# Patient Record
Sex: MALE | Race: WHITE | NOT HISPANIC OR LATINO | Employment: OTHER | URBAN - METROPOLITAN AREA
[De-identification: names, ages, dates, MRNs, and addresses within clinical notes are randomized per-mention and may not be internally consistent; named-entity substitution may affect disease eponyms.]

---

## 2018-04-23 ENCOUNTER — OFFICE VISIT (OUTPATIENT)
Dept: OTOLARYNGOLOGY | Facility: CLINIC | Age: 83
End: 2018-04-23
Payer: MEDICARE

## 2018-04-23 VITALS
BODY MASS INDEX: 24.87 KG/M2 | HEART RATE: 62 BPM | WEIGHT: 173.75 LBS | HEIGHT: 70 IN | SYSTOLIC BLOOD PRESSURE: 147 MMHG | TEMPERATURE: 98 F | DIASTOLIC BLOOD PRESSURE: 72 MMHG

## 2018-04-23 DIAGNOSIS — R49.0 DYSPHONIA: Primary | ICD-10-CM

## 2018-04-23 PROCEDURE — 99203 OFFICE O/P NEW LOW 30 MIN: CPT | Mod: 25,S$PBB,, | Performed by: OTOLARYNGOLOGY

## 2018-04-23 PROCEDURE — 99203 OFFICE O/P NEW LOW 30 MIN: CPT | Mod: PBBFAC,PO | Performed by: OTOLARYNGOLOGY

## 2018-04-23 PROCEDURE — 31575 DIAGNOSTIC LARYNGOSCOPY: CPT | Mod: S$PBB,,, | Performed by: OTOLARYNGOLOGY

## 2018-04-23 PROCEDURE — 99999 PR PBB SHADOW E&M-NEW PATIENT-LVL III: CPT | Mod: PBBFAC,,, | Performed by: OTOLARYNGOLOGY

## 2018-04-23 PROCEDURE — 31575 DIAGNOSTIC LARYNGOSCOPY: CPT | Mod: PBBFAC,PO | Performed by: OTOLARYNGOLOGY

## 2018-04-23 RX ORDER — LEVOTHYROXINE SODIUM 100 UG/1
TABLET ORAL
COMMUNITY
End: 2023-03-27 | Stop reason: DRUGHIGH

## 2018-04-23 RX ORDER — SIMVASTATIN 20 MG/1
20 TABLET, FILM COATED ORAL NIGHTLY
COMMUNITY

## 2018-04-23 RX ORDER — METFORMIN HYDROCHLORIDE 500 MG/1
500 TABLET ORAL
COMMUNITY

## 2018-04-23 NOTE — PROGRESS NOTES
Referring Provider:    No referring provider defined for this encounter.  Subjective:   Patient: Armando Mckeon [id number], :1935   Visit date:2018 3:41 PM    Chief Complaint:  Sore Throat (PCP N/A pt states that he has chronic hoarseness, ab 6-7 months. )    HPI:  Armando is a 82 y.o. 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   Post nasal drip or significant rhinorrhea:  No  Bad taste in the back of the mouth: No  Heartburn: No  Number of cups of caffeinated beverages daily: 1  Number of alcoholic beverages daily: 0  Smoking: No  Prior medical therapy:    - None - and Proton Pump Inhibitor or H2 Blocker which has been ineffective.    Primary complaint is decreased vocal projection as well as lower tone.        Review of Systems:  Negative unless checked off.  Gen:  []fever   []fatigue  HENT:  []nosebleeds  []dental problem   Eyes:  []photophobia  []visual disturbance  Resp:  []chest tightness []wheezing  Card:  []chest pain  []leg swelling  GI:  []abdominal pain []blood in stool  :  []dysuria  []hematuria  Musc:  []joint swelling  []gait problem  Skin:  []color change  []pallor  Neuro:  []seizures  []numbness  Hem:  []bruise/bleed easily  Psych:  []hallucinations  []behavioral problems  Allergy/Imm: has No Known Allergies.    His meds, allergies, medical, surgical, social & family histories were reviewed & updated:  -     He has a current medication list which includes the following prescription(s): levothyroxine, metformin, and simvastatin.  -     He  has no past medical history on file.   -     He  does not have a problem list on file.   -     He  has no past surgical history on file.  -     He  reports that he has quit smoking. He does not have  "any smokeless tobacco history on file.  -     His family history is not on file.  -     He has No Known Allergies.    Objective:   Physical Exam:  Vitals:  BP (!) 147/72 (BP Location: Left arm, Patient Position: Sitting)   Pulse 62   Temp 97.5 °F (36.4 °C) (Tympanic)   Ht 5' 10" (1.778 m)   Wt 78.8 kg (173 lb 11.6 oz)   BMI 24.93 kg/m²   General appearance:  Well developed, well nourished    Eyes:  Extraocular motions intact, PERRL    Communication:  no hoarseness, no dysphonia    Ears:  Otoscopy of external auditory canals and tympanic membranes was normal, clinical speech reception thresholds grossly intact, no mass/lesion of auricle.  Nose:  No masses/lesions of external nose, nasal mucosa, septum, and turbinates were within normal limits.  Mouth:  No mass/lesion of lips, teeth, gums, hard/soft palate, tongue, tonsils, or oropharynx.    Cardiovascular:  No pedal edema; Radial Pulses +2     Neck & Lymphatics:  No cervical lymphadenopathy, no neck mass/crepitus/ asymmetry, trachea is midline, no thyroid enlargement/tenderness/mass.    Psych: Oriented x3,  Alert with normal mood and affect.     Respiration/Chest:  Symmetric expansion during respiration, normal respiratory effort.    Skin:  Warm and intact. No ulcerations of face, scalp, neck.      Assessment & Plan:   Recommend comprehensive voice assessment with Dr. Warner.  Referral placed today. We discussed his medical conditions, treatments and plan.  Armando should return to clinic if any issues arise (symptoms worsen or persist), otherwise we will see him back in the clinic only as needed.              -------------------------------------------------------------------------------------------------------------------        Endoscopic Exam:  Patient: Armando Mckeon [id number], :1935  Procedure date:2018  Patient's medications, allergies, past medical, surgical, social and family histories were reviewed and updated as appropriate.  Chief " Complaint:  Sore Throat (PCP N/A pt states that he has chronic hoarseness, ab 6-7 months. )    HPI:  Armando is a 82 y.o. male with the history of present illness as discussed in the clinic note from today.    Procedure: Risks, benefits, and alternatives of the procedure were discussed with the patient, and the patient consented to the nasal endoscopy.  The nasal cavity was sprayed with a topical decongestant and anesthetic (if needed). The endoscope was passed into each nostril and each nasal cavity was visualized.  On each side the nasal cavity, sinuses (if open), turbinates, and septum were examined with the findings described below. At the end of the examination, the scope was removed. The patient tolerated the procedure well with no complications.       Endoscopic Exam Findings:  -     The right side has normal mucosa  -     The left side has normal mucosa  -     Nasal secretions: No discolored secretions noted bilaterally  -     Nasal septum: no significant deviation or perforation appreciated   -     Inferior turbinate: Normal mucosa without significant hypertrophy bilaterally  -     Middle turbinate: Normal mucosa without significant hypertrophy bilaterally  -     Other findings: No mass or obstructive lesion      -     Laryngeal mucosa is normal  -     Posterior commissure has mild  hypertrophy  -     Lingual tonsils have no  hypertrophy  -     Adenoids have no hypertrophy  -     Right vocal fold: normal mobility     mass/lesion: none  -     Left vocal fold: normal mobility     mass/lesion: none  -     Other findings: tiny nodule on left TVF    Assessment & Plan:  - see today's clinic note

## 2023-01-12 ENCOUNTER — OFFICE VISIT (OUTPATIENT)
Dept: FAMILY MEDICINE | Facility: CLINIC | Age: 88
End: 2023-01-12
Payer: MEDICARE

## 2023-01-12 VITALS
TEMPERATURE: 98 F | OXYGEN SATURATION: 95 % | RESPIRATION RATE: 18 BRPM | SYSTOLIC BLOOD PRESSURE: 118 MMHG | DIASTOLIC BLOOD PRESSURE: 61 MMHG | WEIGHT: 189.13 LBS | HEART RATE: 67 BPM

## 2023-01-12 DIAGNOSIS — R73.01 IMPAIRED FASTING GLUCOSE: ICD-10-CM

## 2023-01-12 DIAGNOSIS — E03.9 ACQUIRED HYPOTHYROIDISM: Primary | ICD-10-CM

## 2023-01-12 PROCEDURE — 99203 PR OFFICE/OUTPT VISIT, NEW, LEVL III, 30-44 MIN: ICD-10-PCS | Mod: S$PBB,,, | Performed by: INTERNAL MEDICINE

## 2023-01-12 PROCEDURE — 99999 PR PBB SHADOW E&M-NEW PATIENT-LVL III: ICD-10-PCS | Mod: PBBFAC,,, | Performed by: INTERNAL MEDICINE

## 2023-01-12 PROCEDURE — 99999 PR PBB SHADOW E&M-NEW PATIENT-LVL III: CPT | Mod: PBBFAC,,, | Performed by: INTERNAL MEDICINE

## 2023-01-12 PROCEDURE — 99203 OFFICE O/P NEW LOW 30 MIN: CPT | Mod: PBBFAC,PN | Performed by: INTERNAL MEDICINE

## 2023-01-12 PROCEDURE — 99203 OFFICE O/P NEW LOW 30 MIN: CPT | Mod: S$PBB,,, | Performed by: INTERNAL MEDICINE

## 2023-01-12 RX ORDER — LEVOTHYROXINE SODIUM 125 UG/1
125 TABLET ORAL
COMMUNITY
End: 2023-01-13 | Stop reason: SDUPTHER

## 2023-01-12 RX ORDER — TAMSULOSIN HYDROCHLORIDE 0.4 MG/1
1 CAPSULE ORAL DAILY
COMMUNITY
Start: 2022-12-06

## 2023-01-12 RX ORDER — METFORMIN HYDROCHLORIDE 500 MG/1
500 TABLET ORAL 2 TIMES DAILY WITH MEALS
COMMUNITY
End: 2023-01-12

## 2023-01-12 NOTE — PATIENT INSTRUCTIONS
Take levothyroxine (Thyroid medication) first thing in the morning on empty stomach  Do not take any food for thirty minutes after taking this medication    Will get blood work done in four weeks to follow up thyroid level and to see me one week after bloodwork

## 2023-01-12 NOTE — PROGRESS NOTES
Subjective:       Patient ID: Armando Mckeon is a 87 y.o. male.    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 He has been asking repetivie questions for several months late last year he had episode of getting lost while driving and has not driven since then. He continues to be very active (works in [employer]) he has been prescribed levothyroxine for > 10 years. Review of records show TSH has been elevated at last two checks. He is not sure what dosage he is taking. He takes the medicaiton at varying times of day sometiems with food. No dysphagia no personal history of neck or chest radiation     PMHx: reported history of diabetes he is not taking any medication for this (metformin was listed on one of his outside medicaiton lists but he reports not taking anythign other then thyroid medicaiton)     Review of Systems   Constitutional:  Negative for activity change, fever and unexpected weight change.   HENT:  Negative for congestion, rhinorrhea, sore throat and trouble swallowing.    Eyes:  Negative for photophobia and redness.   Respiratory:  Negative for cough, chest tightness, shortness of breath and wheezing.    Cardiovascular:  Negative for chest pain, palpitations and leg swelling.   Gastrointestinal:  Negative for abdominal pain, blood in stool, constipation, diarrhea, nausea and vomiting.   Endocrine: Negative for cold intolerance, heat intolerance and polyuria.   Genitourinary:  Negative for decreased urine volume, difficulty urinating, dysuria and urgency.   Musculoskeletal:  Negative for arthralgias and back pain.   Skin:  Negative for rash.   Neurological:  Negative for dizziness, syncope, weakness and headaches.   Psychiatric/Behavioral:  Positive for confusion. Negative for dysphoric mood, sleep disturbance and suicidal ideas.       Objective:     Vitals:    01/12/23 1459   BP: 118/61   Pulse: 67   Resp: 18   Temp: 98.3 °F (36.8 °C)   TempSrc: Oral   SpO2: 95%   Weight: 85.8 kg (189 lb 2.5 oz)          Physical Exam  Constitutional:       Appearance: He is well-developed.   HENT:      Head: Normocephalic and atraumatic.   Eyes:      Conjunctiva/sclera: Conjunctivae normal.   Cardiovascular:      Rate and Rhythm: Normal rate and regular rhythm.      Heart sounds: No murmur heard.    No friction rub. No gallop.   Pulmonary:      Effort: Pulmonary effort is normal.      Breath sounds: Normal breath sounds. No wheezing or rales.   Abdominal:      Palpations: Abdomen is soft.      Tenderness: There is no abdominal tenderness. There is no guarding or rebound.   Musculoskeletal:         General: No tenderness. Normal range of motion.      Cervical back: Normal range of motion and neck supple.   Lymphadenopathy:      Cervical: No cervical adenopathy.   Skin:     General: Skin is warm and dry.   Neurological:      Mental Status: He is alert and oriented to person, place, and time.      Cranial Nerves: No cranial nerve deficit.      Comments: Normal gait able to step up to exam table without assistance no cogwheeling of wrists no LE swelling   Psychiatric:      Comments: Frequent repeative questions regarding thyroid medication. He is oriented to time place and person        Assessment and Plan   1. Acquired hypothyroidism  Unclear compliance and what dose of levothyroxine he is taking stressed importance of taking same time each day on empty stomach, i've asked his parishoner friend to contact clinic to confirm dosage of levothyroxine he is currently taking will plan to repeat tsh in four weeks after taking in consistent matter to further determine mediation adjustment or further neurological work up for poor short term memory  - Comprehensive Metabolic Panel; Future  - TSH; Future  - CBC Auto Differential; Future    2. Impaired fasting glucose  No  medications currently check a1c with next labs  - Hemoglobin A1C; Future

## 2023-01-13 DIAGNOSIS — E03.9 ACQUIRED HYPOTHYROIDISM: Primary | ICD-10-CM

## 2023-01-13 RX ORDER — METFORMIN HYDROCHLORIDE 500 MG/1
500 TABLET ORAL 2 TIMES DAILY WITH MEALS
COMMUNITY
End: 2023-01-13

## 2023-01-13 RX ORDER — LEVOTHYROXINE SODIUM 125 UG/1
125 TABLET ORAL
Qty: 90 TABLET | Refills: 0 | Status: SHIPPED | OUTPATIENT
Start: 2023-01-13 | End: 2023-03-27

## 2023-01-13 RX ORDER — METFORMIN HYDROCHLORIDE 500 MG/1
500 TABLET ORAL 2 TIMES DAILY WITH MEALS
COMMUNITY

## 2023-01-13 NOTE — TELEPHONE ENCOUNTER
Caregiver also reports that she and the patient don't recall being told that he has diabetes and they're unsure if he should be on Metformin - they would like to wait until he gets the blood work drawn.

## 2023-01-13 NOTE — TELEPHONE ENCOUNTER
----- Message from Carli Godinez sent at 1/13/2023  9:52 AM CST -----  Regarding: Self/   [phone]  Type: RX Refill Request    Who Called:  Patient    Refill or New Rx:  Refill    RX Name and Strength:  levothyroxine (SYNTHROID) 125 MCG tablet    Preferred Pharmacy with phone number:  "[id number] - [place], [place] - [address] AT [facility]    Local or Mail Order:  Local    Ordering Provider:  Bo    Would the patient rather a call back or a response via [facility]?  Call back    Best Call Back Number:  [phone]

## 2023-01-13 NOTE — TELEPHONE ENCOUNTER
----- Message from Carli Godinez sent at 1/13/2023  9:54 AM CST -----  Regarding: Kimberly/ Caregiver/  [phone]  Type: Patient Call Back    Who called:  Patient    What is the request in detail:  Patient called to give medication:  Metformin 500 MG,  Tamsulosin .4 MG and Levothyroxine 125 MCG.  Thank you    Would the patient rather a call back or a response via [facility]?      Best call back number:  [phone]        Thank you

## 2023-01-13 NOTE — TELEPHONE ENCOUNTER
No new care gaps identified.  [facility] Embedded Care Gaps. Reference number: [id number]. 1/13/2023   10:02:13 AM CST

## 2023-02-08 ENCOUNTER — TELEPHONE (OUTPATIENT)
Dept: FAMILY MEDICINE | Facility: CLINIC | Age: 88
End: 2023-02-08
Payer: MEDICARE

## 2023-02-08 ENCOUNTER — LAB VISIT (OUTPATIENT)
Dept: LAB | Facility: OTHER | Age: 88
End: 2023-02-08
Attending: INTERNAL MEDICINE
Payer: MEDICARE

## 2023-02-08 DIAGNOSIS — E03.9 ACQUIRED HYPOTHYROIDISM: ICD-10-CM

## 2023-02-08 DIAGNOSIS — R41.81 AGE-RELATED COGNITIVE DECLINE: Primary | ICD-10-CM

## 2023-02-08 DIAGNOSIS — R73.01 IMPAIRED FASTING GLUCOSE: ICD-10-CM

## 2023-02-08 LAB
ALBUMIN SERPL BCP-MCNC: 3.9 G/DL (ref 3.5–5.2)
ALP SERPL-CCNC: 47 U/L (ref 55–135)
ALT SERPL W/O P-5'-P-CCNC: 14 U/L (ref 10–44)
ANION GAP SERPL CALC-SCNC: 7 MMOL/L (ref 8–16)
AST SERPL-CCNC: 19 U/L (ref 10–40)
BASOPHILS # BLD AUTO: 0.03 K/UL (ref 0–0.2)
BASOPHILS NFR BLD: 0.4 % (ref 0–1.9)
BILIRUB SERPL-MCNC: 0.7 MG/DL (ref 0.1–1)
BUN SERPL-MCNC: 23 MG/DL (ref 8–23)
CALCIUM SERPL-MCNC: 9.4 MG/DL (ref 8.7–10.5)
CHLORIDE SERPL-SCNC: 107 MMOL/L (ref 95–110)
CO2 SERPL-SCNC: 27 MMOL/L (ref 23–29)
CREAT SERPL-MCNC: 1.1 MG/DL (ref 0.5–1.4)
DIFFERENTIAL METHOD: ABNORMAL
EOSINOPHIL # BLD AUTO: 0.1 K/UL (ref 0–0.5)
EOSINOPHIL NFR BLD: 1.7 % (ref 0–8)
ERYTHROCYTE [DISTWIDTH] IN BLOOD BY AUTOMATED COUNT: 13.4 % (ref 11.5–14.5)
EST. GFR  (NO RACE VARIABLE): >60 ML/MIN/1.73 M^2
ESTIMATED AVG GLUCOSE: 126 MG/DL (ref 68–131)
GLUCOSE SERPL-MCNC: 97 MG/DL (ref 70–110)
HBA1C MFR BLD: 6 % (ref 4–5.6)
HCT VFR BLD AUTO: 40.4 % (ref 40–54)
HGB BLD-MCNC: 12.9 G/DL (ref 14–18)
IMM GRANULOCYTES # BLD AUTO: 0.02 K/UL (ref 0–0.04)
IMM GRANULOCYTES NFR BLD AUTO: 0.2 % (ref 0–0.5)
LYMPHOCYTES # BLD AUTO: 2.4 K/UL (ref 1–4.8)
LYMPHOCYTES NFR BLD: 28.8 % (ref 18–48)
MCH RBC QN AUTO: 30.8 PG (ref 27–31)
MCHC RBC AUTO-ENTMCNC: 31.9 G/DL (ref 32–36)
MCV RBC AUTO: 96 FL (ref 82–98)
MONOCYTES # BLD AUTO: 0.8 K/UL (ref 0.3–1)
MONOCYTES NFR BLD: 9.3 % (ref 4–15)
NEUTROPHILS # BLD AUTO: 4.9 K/UL (ref 1.8–7.7)
NEUTROPHILS NFR BLD: 59.6 % (ref 38–73)
NRBC BLD-RTO: 0 /100 WBC
PLATELET # BLD AUTO: 185 K/UL (ref 150–450)
PMV BLD AUTO: 10.8 FL (ref 9.2–12.9)
POTASSIUM SERPL-SCNC: 4.5 MMOL/L (ref 3.5–5.1)
PROT SERPL-MCNC: 6.9 G/DL (ref 6–8.4)
RBC # BLD AUTO: 4.19 M/UL (ref 4.6–6.2)
SODIUM SERPL-SCNC: 141 MMOL/L (ref 136–145)
T4 FREE SERPL-MCNC: 1.03 NG/DL (ref 0.71–1.51)
TSH SERPL DL<=0.005 MIU/L-ACNC: 0.37 UIU/ML (ref 0.4–4)
WBC # BLD AUTO: 8.27 K/UL (ref 3.9–12.7)

## 2023-02-08 PROCEDURE — 84439 ASSAY OF FREE THYROXINE: CPT | Performed by: INTERNAL MEDICINE

## 2023-02-08 PROCEDURE — 85025 COMPLETE CBC W/AUTO DIFF WBC: CPT | Performed by: INTERNAL MEDICINE

## 2023-02-08 PROCEDURE — 83036 HEMOGLOBIN GLYCOSYLATED A1C: CPT | Performed by: INTERNAL MEDICINE

## 2023-02-08 PROCEDURE — 36415 COLL VENOUS BLD VENIPUNCTURE: CPT | Performed by: INTERNAL MEDICINE

## 2023-02-08 PROCEDURE — 80053 COMPREHEN METABOLIC PANEL: CPT | Performed by: INTERNAL MEDICINE

## 2023-02-08 PROCEDURE — 84443 ASSAY THYROID STIM HORMONE: CPT | Performed by: INTERNAL MEDICINE

## 2023-02-08 NOTE — TELEPHONE ENCOUNTER
Patient contacted and ID confirmed by name and   Reviewed tsh therapeutic he is taking levothyroxine 125mcg same time each morning does not notice any improvement in memory. No electrolyte dergangement suspect some degree of cognitive decline contributing referral to neurology for consideration of advanced imaging

## 2023-04-05 ENCOUNTER — TELEPHONE (OUTPATIENT)
Dept: NEUROLOGY | Facility: CLINIC | Age: 88
End: 2023-04-05
Payer: MEDICARE

## 2023-04-05 NOTE — TELEPHONE ENCOUNTER
After an appointment No Show in [facility], reached out to pt ton confirm whether he was aware of scheduled appointment and if he desires to reschedule for age-related memory concerns.  Please verify address and preferred location, whether pt will accept virtual visit, and if he is willing to travel to [facility] for care.    Pt was incorrectly scheduled and will be r/s in resident clinic if [facility] is preference.

## 2023-12-28 ENCOUNTER — HOSPITAL ENCOUNTER (OUTPATIENT)
Dept: RADIOLOGY | Facility: OTHER | Age: 88
Discharge: HOME | End: 2023-12-28
Attending: INTERNAL MEDICINE
Payer: MEDICARE

## 2023-12-28 ENCOUNTER — OFFICE VISIT (OUTPATIENT)
Dept: FAMILY MEDICINE | Facility: CLINIC | Age: 88
End: 2023-12-28
Payer: MEDICARE

## 2023-12-28 VITALS
WEIGHT: 192.69 LBS | TEMPERATURE: 98 F | DIASTOLIC BLOOD PRESSURE: 68 MMHG | BODY MASS INDEX: 27.58 KG/M2 | HEIGHT: 70 IN | SYSTOLIC BLOOD PRESSURE: 144 MMHG | OXYGEN SATURATION: 98 % | HEART RATE: 90 BPM

## 2023-12-28 DIAGNOSIS — E03.9 ACQUIRED HYPOTHYROIDISM: ICD-10-CM

## 2023-12-28 DIAGNOSIS — R73.01 IMPAIRED FASTING GLUCOSE: ICD-10-CM

## 2023-12-28 DIAGNOSIS — F03.90 DEMENTIA, UNSPECIFIED DEMENTIA SEVERITY, UNSPECIFIED DEMENTIA TYPE, UNSPECIFIED WHETHER BEHAVIORAL, PSYCHOTIC, OR MOOD DISTURBANCE OR ANXIETY: Primary | ICD-10-CM

## 2023-12-28 DIAGNOSIS — R41.3 OTHER AMNESIA: ICD-10-CM

## 2023-12-28 PROCEDURE — 99214 PR OFFICE/OUTPT VISIT, EST, LEVL IV, 30-39 MIN: ICD-10-PCS | Mod: S$PBB,,, | Performed by: INTERNAL MEDICINE

## 2023-12-28 PROCEDURE — 99999 PR PBB SHADOW E&M-EST. PATIENT-LVL IV: ICD-10-PCS | Mod: PBBFAC,,, | Performed by: INTERNAL MEDICINE

## 2023-12-28 PROCEDURE — 70450 CT HEAD/BRAIN W/O DYE: CPT | Mod: TC

## 2023-12-28 PROCEDURE — 99214 OFFICE O/P EST MOD 30 MIN: CPT | Mod: S$PBB,,, | Performed by: INTERNAL MEDICINE

## 2023-12-28 PROCEDURE — 70450 CT HEAD WITHOUT CONTRAST: ICD-10-PCS | Mod: 26,,, | Performed by: RADIOLOGY

## 2023-12-28 PROCEDURE — 99214 OFFICE O/P EST MOD 30 MIN: CPT | Mod: PBBFAC,25,PN | Performed by: INTERNAL MEDICINE

## 2023-12-28 PROCEDURE — 99999 PR PBB SHADOW E&M-EST. PATIENT-LVL IV: CPT | Mod: PBBFAC,,, | Performed by: INTERNAL MEDICINE

## 2023-12-28 PROCEDURE — 70450 CT HEAD/BRAIN W/O DYE: CPT | Mod: 26,,, | Performed by: RADIOLOGY

## 2023-12-28 NOTE — PROGRESS NOTES
"Subjective:       Patient ID: Armando Mckeon is a 88 y.o. male.    Chief Complaint: Memory Loss    Discuss memory    HPI: 89 y/o w/ hypothyroid presents with one of his colleauges to discuss memory issues. 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 At that time it was unclear if he was taking levothyroxine regularlly. He never followed up with neurology as referred then. 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 He does not have any family members. He is not sure if he is taking levothyroxine he denies any bowel or bladder incontinence. He has associates who do grocery shopping and meal preparation for him. He is independent in dressing and self care. No reported falls he denies AH/VH mood "fine" sleep "fine"      Review of Systems   Constitutional:  Negative for activity change, fatigue and unexpected weight change.   Gastrointestinal:  Negative for abdominal pain and constipation.   Genitourinary:  Negative for difficulty urinating, frequency and urgency.   Psychiatric/Behavioral:  Positive for confusion.        Objective:     Vitals:    12/28/23 0858   BP: (!) 144/68   BP Location: Right arm   Patient Position: Sitting   BP Method: Medium (Manual)   Pulse: 90   Temp: 98.2 °F (36.8 °C)   TempSrc: Oral   SpO2: 98%   Weight: 87.4 kg (192 lb 10.9 oz)   Height: 5' 10" (1.778 m)          Physical Exam  Constitutional:       Comments: When asked year "2014" unsure of time of year does not recall that it was recently Christmas. Able to draw clock face (see media tab) but unable to make time 1015 as requested   Neurological:      Mental Status: He is alert.      Comments: Atremulous no cog wheeling with PROM of wrists, normal gait with symmetric foot rise (no shuffling)          Assessment and Plan   1. Dementia, unspecified dementia " severity, unspecified dementia type, unspecified whether behavioral, psychotic, or mood disturbance or anxiety  Suspect dementia based on presentation and history check labs for reversible causes consider hypothyroid but lack of other systemic symptoms makes this unlikely  - CBC Auto Differential; Future  - Comprehensive Metabolic Panel; Future  - Folate; Future  - Vitamin B12; Future  - RPR; Future  - Ambulatory referral/consult to Neurology; Future    2. Acquired hypothyroidism  Check tsh  - TSH; Future    3. Impaired fasting glucose  Screen with a1c  - Hemoglobin A1C; Future    4. Other amnesia  Check non contrast ct for acute involution and hydrocephalous normal motor function on exam  - CT Head Without Contrast; Future      Addendum: contacted patient's colleauge FrElvira Cullen reviewed essentially normal structrual CT of head, TSH mildly elevated needs assistance to ensure he is taking medicaiton regularly. This would not alone explain his poor short term memory and still suspect underlying alzheimers dementia referral to neurology for consideraiton of advanced imaging if deemed appropriate

## 2023-12-28 NOTE — PATIENT INSTRUCTIONS
You should be taking 125 micrograms (mcg) of levothyroxine (SYNTHROID) every day at about the same time on an empty stomach

## 2024-04-05 ENCOUNTER — TELEPHONE (OUTPATIENT)
Dept: NEUROLOGY | Facility: CLINIC | Age: 89
End: 2024-04-05
Payer: MEDICARE

## 2024-07-23 ENCOUNTER — PATIENT OUTREACH (OUTPATIENT)
Dept: ADMINISTRATIVE | Facility: HOSPITAL | Age: 89
End: 2024-07-23
Payer: MEDICARE

## 2024-07-23 NOTE — PROGRESS NOTES
Pt relocated to [facility]. 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 Gap report updated. Immunization's updated/triggered.